# Patient Record
Sex: MALE | Race: WHITE | NOT HISPANIC OR LATINO | URBAN - METROPOLITAN AREA
[De-identification: names, ages, dates, MRNs, and addresses within clinical notes are randomized per-mention and may not be internally consistent; named-entity substitution may affect disease eponyms.]

---

## 2022-04-25 ENCOUNTER — OFFICE VISIT (OUTPATIENT)
Dept: FAMILY MEDICINE | Facility: CLINIC | Age: 27
End: 2022-04-25
Payer: COMMERCIAL

## 2022-04-25 VITALS
BODY MASS INDEX: 35.76 KG/M2 | RESPIRATION RATE: 15 BRPM | WEIGHT: 264 LBS | TEMPERATURE: 97.8 F | OXYGEN SATURATION: 96 % | HEART RATE: 50 BPM | HEIGHT: 72 IN | DIASTOLIC BLOOD PRESSURE: 70 MMHG | SYSTOLIC BLOOD PRESSURE: 110 MMHG

## 2022-04-25 DIAGNOSIS — F32.A DEPRESSION, UNSPECIFIED DEPRESSION TYPE: Primary | ICD-10-CM

## 2022-04-25 DIAGNOSIS — Z13.6 SCREENING FOR CARDIOVASCULAR CONDITION: ICD-10-CM

## 2022-04-25 LAB
ALBUMIN SERPL-MCNC: 3.8 G/DL (ref 3.4–5)
ALP SERPL-CCNC: 84 U/L (ref 40–150)
ALT SERPL W P-5'-P-CCNC: 41 U/L (ref 0–70)
ANION GAP SERPL CALCULATED.3IONS-SCNC: 4 MMOL/L (ref 3–14)
AST SERPL W P-5'-P-CCNC: 15 U/L (ref 0–45)
BILIRUB SERPL-MCNC: 0.6 MG/DL (ref 0.2–1.3)
BUN SERPL-MCNC: 12 MG/DL (ref 7–30)
CALCIUM SERPL-MCNC: 9.3 MG/DL (ref 8.5–10.1)
CHLORIDE BLD-SCNC: 108 MMOL/L (ref 94–109)
CHOLEST SERPL-MCNC: 177 MG/DL
CO2 SERPL-SCNC: 28 MMOL/L (ref 20–32)
CREAT SERPL-MCNC: 0.85 MG/DL (ref 0.66–1.25)
FASTING STATUS PATIENT QL REPORTED: NO
GFR SERPL CREATININE-BSD FRML MDRD: >90 ML/MIN/1.73M2
GLUCOSE BLD-MCNC: 92 MG/DL (ref 70–99)
HBA1C MFR BLD: 5.3 % (ref 0–5.6)
HDLC SERPL-MCNC: 47 MG/DL
LDLC SERPL CALC-MCNC: 116 MG/DL
NONHDLC SERPL-MCNC: 130 MG/DL
POTASSIUM BLD-SCNC: 4.2 MMOL/L (ref 3.4–5.3)
PROT SERPL-MCNC: 7.3 G/DL (ref 6.8–8.8)
SODIUM SERPL-SCNC: 140 MMOL/L (ref 133–144)
TRIGL SERPL-MCNC: 71 MG/DL
TSH SERPL DL<=0.005 MIU/L-ACNC: 1.02 MU/L (ref 0.4–4)

## 2022-04-25 PROCEDURE — 80053 COMPREHEN METABOLIC PANEL: CPT | Performed by: FAMILY MEDICINE

## 2022-04-25 PROCEDURE — 80061 LIPID PANEL: CPT | Performed by: FAMILY MEDICINE

## 2022-04-25 PROCEDURE — 84443 ASSAY THYROID STIM HORMONE: CPT | Performed by: FAMILY MEDICINE

## 2022-04-25 ASSESSMENT — ANXIETY QUESTIONNAIRES
5. BEING SO RESTLESS THAT IT IS HARD TO SIT STILL: NOT AT ALL
7. FEELING AFRAID AS IF SOMETHING AWFUL MIGHT HAPPEN: SEVERAL DAYS
6. BECOMING EASILY ANNOYED OR IRRITABLE: MORE THAN HALF THE DAYS
1. FEELING NERVOUS, ANXIOUS, OR ON EDGE: SEVERAL DAYS
IF YOU CHECKED OFF ANY PROBLEMS ON THIS QUESTIONNAIRE, HOW DIFFICULT HAVE THESE PROBLEMS MADE IT FOR YOU TO DO YOUR WORK, TAKE CARE OF THINGS AT HOME, OR GET ALONG WITH OTHER PEOPLE: SOMEWHAT DIFFICULT
3. WORRYING TOO MUCH ABOUT DIFFERENT THINGS: MORE THAN HALF THE DAYS
2. NOT BEING ABLE TO STOP OR CONTROL WORRYING: SEVERAL DAYS
GAD7 TOTAL SCORE: 8

## 2022-04-25 ASSESSMENT — PATIENT HEALTH QUESTIONNAIRE - PHQ9
5. POOR APPETITE OR OVEREATING: SEVERAL DAYS
SUM OF ALL RESPONSES TO PHQ QUESTIONS 1-9: 15

## 2022-04-25 NOTE — PATIENT INSTRUCTIONS
ASSESSMENT/PLAN:  Panfilo is new to our clinic.     Has depression  Also, with some intermittent low back pain and BMI of 35.   - Major factor may be loneliness as new to area   - Suggested meeting with our counselor Shawn Ullrich, LICSW  - Also, aim for more outdoor time, take dog walks in the morning and evening. If possible, look into a bicycle  - Encouraged trying to find social connections in the area.   -Discussed that improved diet may help. Try to eat green veggies in the morning and evening. Minimize fast foods. Try to bring lunch to work. If possible, try to prepare your foods ahead of time. Meal plan for the week.   Discussed that weight loss and more strength may help decrease back pain exacerbations.   Goal of about 1 lb/week of weight loss.   -Check labs  -Follow up in 3-4 weeks time, sooner PRN.       --Ravi Quintana MD

## 2022-04-25 NOTE — PROGRESS NOTES
"OVERVIEW: Panfilo Pineda is a 26 year old male who presents to [facility] today for Physical (New, est) and Mental Health Problem (Talk about mental health as well )        ASSESSMENT/PLAN:  Panfilo is new to our clinic.     1. Has depression  2. Also, with some intermittent low back pain and BMI of 35.   - Major factor may be loneliness as new to [facility]   - Suggested meeting with our counselor Shawn Ullrich, LICSW  - Also, aim for more outdoor time, take dog walks in the morning and evening. If possible, look into a bicycle  - Encouraged trying to find social connections in the area.   -Discussed that improved diet may help. Try to eat green veggies in the morning and evening. Minimize fast foods. Try to bring lunch to work. If possible, try to prepare your foods ahead of time. Meal plan for the week.   Discussed that weight loss and more strength may help decrease back pain exacerbations.   Goal of about 1 lb/week of weight loss.   -Check labs  -Follow up in 3-4 weeks time, sooner PRN.       --Ravi Quintana MD      SUBJECTIVE:   This is Panfilo's first visit to our clinic.   Presents mainly to discuss Mental Health.     He's noticing that he's more checked out recently. Having trouble quieting brain before sleep.   He's feeling sad and sensitive.   No thoughts of self-harm. Does not have weapons.   He is new to [place]. Moved here in Sept 2021 and has no great social connections here.   Has never had serious depression in the past.     Also, a few days ago, had some low back pain after a lifting episode.     Exercise = \"None,\" except 20 mins of dog walking.   Days are mostly, work, then home then video games. Feels that video games are the only time that he's truly focused.       Review Of Systems:  Awakes early in morning to urinate.   His last liquids are around 11pm.   He's not sexually active and feels at no risk for STDs.     Has otherwise been in usual state of health, e.g.   Cardiovascular: " negative  Respiratory: No shortness of breath, dyspnea on exertion, cough, or hemoptysis  Gastrointestinal: negative      Problem list per EMR:  There is no problem list on file for this patient.      No current outpatient medications on file.       Allergies   Allergen Reactions     Penicillins         Social:   Social History     Social History Narrative    Grew up in [place].     Came to [place] in Sept 2021.     A manager at [employer].         OBJECTIVE    Vitals: /70 (BP Location: Right arm, Patient Position: Sitting, Cuff Size: Adult Regular)   Pulse 50   Temp 97.8  F (36.6  C) (Skin)   Resp 15   Ht 1.829 m (6')   Wt 119.7 kg (264 lb)   SpO2 96%   BMI 35.80 kg/m    BMI= Body mass index is 35.8 kg/m .  He appears well and in no distress.  Rises from a seated position easily.  His low back has no deformities.  He can bend over and touch his toes without difficulty.  He can do repeated heel raises without difficulty.    Cardiovascular-regular rate and rhythm without murmur.    Lungs-clear to auscultation    Abdomen- normal.  No hepatosplenomegaly    Extremities-full range of motion of the hips and knees.    PHQ-9 with a score of 15.  No feelings of self-harm    LALI-7 score at 8.    SEE TOP OF NOTE FOR ASSESSMENT AND PLAN    --Ravi Quintana MD  [facility], Department of Family Medicine and Community Health

## 2022-04-25 NOTE — NURSING NOTE
26 year old  Chief Complaint   Patient presents with     Physical     New, est     Mental Health Problem     Talk about mental health as well        Blood pressure 110/70, pulse 50, temperature 97.8  F (36.6  C), temperature source Skin, resp. rate 15, height 1.829 m (6'), weight 119.7 kg (264 lb), SpO2 96 %. Body mass index is 35.8 kg/m .  There is no problem list on file for this patient.      Wt Readings from Last 2 Encounters:   04/25/22 119.7 kg (264 lb)     BP Readings from Last 3 Encounters:   04/25/22 110/70         No current outpatient medications on file.     No current facility-administered medications for this visit.       Social History     Tobacco Use     Smoking status: Never Smoker     Smokeless tobacco: Never Used   Substance Use Topics     Alcohol use: Yes     Comment: 1 x per 3 months     Drug use: Never       Health Maintenance Due   Topic Date Due     PREVENTIVE CARE VISIT  Never done     ADVANCE CARE PLANNING  Never done     COVID-19 Vaccine (1) Never done     HIV SCREENING  Never done     HEPATITIS C SCREENING  Never done     DTAP/TDAP/TD IMMUNIZATION (1 - Tdap) Never done     INFLUENZA VACCINE (1) Never done     PHQ-2 (once per calendar year)  Never done       No results found for: PAP      April 25, 2022 2:28 PM

## 2022-04-26 ASSESSMENT — ANXIETY QUESTIONNAIRES: GAD7 TOTAL SCORE: 8

## 2022-05-29 ENCOUNTER — HEALTH MAINTENANCE LETTER (OUTPATIENT)
Age: 27
End: 2022-05-29

## 2022-10-03 ENCOUNTER — HEALTH MAINTENANCE LETTER (OUTPATIENT)
Age: 27
End: 2022-10-03

## 2023-06-04 ENCOUNTER — HEALTH MAINTENANCE LETTER (OUTPATIENT)
Age: 28
End: 2023-06-04

## 2024-07-28 ENCOUNTER — HEALTH MAINTENANCE LETTER (OUTPATIENT)
Age: 29
End: 2024-07-28